# Patient Record
Sex: FEMALE | Race: BLACK OR AFRICAN AMERICAN | NOT HISPANIC OR LATINO | URBAN - METROPOLITAN AREA
[De-identification: names, ages, dates, MRNs, and addresses within clinical notes are randomized per-mention and may not be internally consistent; named-entity substitution may affect disease eponyms.]

---

## 2021-12-31 ENCOUNTER — EMERGENCY (EMERGENCY)
Facility: HOSPITAL | Age: 36
LOS: 1 days | Discharge: HOME | End: 2021-12-31
Admitting: EMERGENCY MEDICINE
Payer: MEDICARE

## 2021-12-31 VITALS
HEART RATE: 101 BPM | DIASTOLIC BLOOD PRESSURE: 87 MMHG | SYSTOLIC BLOOD PRESSURE: 130 MMHG | RESPIRATION RATE: 18 BRPM | TEMPERATURE: 98 F | WEIGHT: 199.96 LBS | OXYGEN SATURATION: 100 % | HEIGHT: 66 IN

## 2021-12-31 DIAGNOSIS — Z20.822 CONTACT WITH AND (SUSPECTED) EXPOSURE TO COVID-19: ICD-10-CM

## 2021-12-31 DIAGNOSIS — R05.1 ACUTE COUGH: ICD-10-CM

## 2021-12-31 DIAGNOSIS — B34.9 VIRAL INFECTION, UNSPECIFIED: ICD-10-CM

## 2021-12-31 PROCEDURE — 99283 EMERGENCY DEPT VISIT LOW MDM: CPT

## 2021-12-31 PROCEDURE — U0003: CPT

## 2021-12-31 PROCEDURE — U0005: CPT

## 2021-12-31 PROCEDURE — 99284 EMERGENCY DEPT VISIT MOD MDM: CPT

## 2021-12-31 NOTE — ED PROVIDER NOTE - CLINICAL SUMMARY MEDICAL DECISION MAKING FREE TEXT BOX
Patient is presenting to the Emergency Department with viral URI symptoms, requesting COVID-19/Flu testing.  Patient has an unremarkable focused exam and looks well.  DO not suspect sepsis, pneumonia, strep or other bacterial infection.  Nasopharyngeal PCR has been obtained and patient has been guided on how to obtain their results.  General COVID-19/URI discharge instructions have been given to the patient.

## 2021-12-31 NOTE — ED ADULT TRIAGE NOTE - CHIEF COMPLAINT QUOTE
congestion and chest tightness x 3 days. requesting covid test. speaking in clear appropriate sentences. airway patent. breathing unlabored.

## 2021-12-31 NOTE — ED ADULT TRIAGE NOTE - CAS ELECT INFOMATION PROVIDED
Pt presents to ED C/O intermittent midsternal chest pain. EKG completed in Triage. No distress upon D/C. Req COVID swab/DC instructions

## 2021-12-31 NOTE — ED PROVIDER NOTE - PHYSICAL EXAMINATION
Physical Exam:    CONSTITUTIONAL:  Generally well appearing, no acute distress, alert, awake and oriented  HEENT:  Moist mucous membranes, normal voice, normal posterior pharynx, no sinus ttp  PULM:  No accessory muscle use, speaking full sentences, clear to auscultation  SKIN:  Normal in appearance, normal color

## 2021-12-31 NOTE — ED PROVIDER NOTE - OBJECTIVE STATEMENT
36 year old patient presenting with URI symptoms of cough/congestion for 2 days.  Denies chest pain, shortness of breath, lower extremity edema.

## 2021-12-31 NOTE — ED PROVIDER NOTE - PATIENT PORTAL LINK FT
You can access the FollowMyHealth Patient Portal offered by [facility] by registering at the following website: [email or web address]. By joining [email or web address] [email or web address] portal, you will also be able to view your health information using other applications (apps) compatible with our system.

## 2022-01-01 LAB — SARS-COV-2 RNA SPEC QL NAA+PROBE: SIGNIFICANT CHANGE UP

## 2023-03-02 ENCOUNTER — APPOINTMENT (OUTPATIENT)
Dept: OTOLARYNGOLOGY | Facility: CLINIC | Age: 38
End: 2023-03-02
Payer: COMMERCIAL

## 2023-03-02 VITALS
HEIGHT: 66 IN | HEART RATE: 96 BPM | DIASTOLIC BLOOD PRESSURE: 84 MMHG | OXYGEN SATURATION: 100 % | WEIGHT: 190 LBS | BODY MASS INDEX: 30.53 KG/M2 | SYSTOLIC BLOOD PRESSURE: 136 MMHG | TEMPERATURE: 98 F

## 2023-03-02 DIAGNOSIS — J31.0 CHRONIC RHINITIS: ICD-10-CM

## 2023-03-02 PROCEDURE — 99203 OFFICE O/P NEW LOW 30 MIN: CPT | Mod: 25

## 2023-03-02 PROCEDURE — 31575 DIAGNOSTIC LARYNGOSCOPY: CPT

## 2023-03-02 RX ORDER — FLUTICASONE PROPIONATE 50 UG/1
50 SPRAY, METERED NASAL DAILY
Qty: 1 | Refills: 6 | Status: ACTIVE | COMMUNITY
Start: 2023-03-02 | End: 1900-01-01

## 2023-03-02 NOTE — REASON FOR VISIT
[Initial Evaluation] : an initial evaluation for [FreeTextEntry2] : New occurrence of clear nasal discharge from nostrils for the for the past 5 days.  Patient states her level of severity is a level 9 out of 10 and it occurs constant.  Patient states nothing helps to improve or worsens her New occurrence of clear nasal discharge from nostrils for the for the past 5 days.

## 2023-03-02 NOTE — PROCEDURE
[Congested] : congested [___ cm] : [unfilled]Ucm polyp(s) on the left [Image(s) Captured] : image(s) captured and filed [Topical Lidocaine] : topical lidocaine [Oxymetazoline HCl] : oxymetazoline HCl [Flexible Endoscope] : examined with the flexible endoscope [Serial Number: ___] : Serial Number: [unfilled] [de-identified] : Turbinate Hypertrophy.  Left nasal polyp.  Middle Meatus.

## 2023-03-02 NOTE — HISTORY OF PRESENT ILLNESS
[de-identified] : 37 years old female patient with history of New occurrence of clear nasal discharge from nostrils for the for the past 5 days.   Patient is present today in the office with Tonsil Hypertrophy 3 + .  Turbinate Hypertrophy.  Left nasal polyp.  Middle Meatus.   Patient C/O Loud snoring. Observed episodes of stopped breathing during sleep. Abrupt awakenings accompanied by gasping or choking. Awakening with a dry mouth or sore throat.

## 2023-03-02 NOTE — PHYSICAL EXAM
[Normal] : no rashes [FreeTextEntry1] : Obese [] : septum deviated bilaterally [de-identified] : Large ?polyps Lt [de-identified] : Tonsil Hypertrophy 3 + .  Turbinate Hypertrophy.  Left nasal polyp.  Middle Meatus.   Patient C/O Loud snoring.

## 2023-03-08 ENCOUNTER — OUTPATIENT (OUTPATIENT)
Dept: OUTPATIENT SERVICES | Facility: HOSPITAL | Age: 38
LOS: 1 days | End: 2023-03-08
Payer: COMMERCIAL

## 2023-03-08 ENCOUNTER — APPOINTMENT (OUTPATIENT)
Dept: CT IMAGING | Facility: HOSPITAL | Age: 38
End: 2023-03-08

## 2023-03-09 PROCEDURE — 70486 CT MAXILLOFACIAL W/O DYE: CPT

## 2023-03-09 PROCEDURE — 70486 CT MAXILLOFACIAL W/O DYE: CPT | Mod: 26

## 2023-03-14 ENCOUNTER — APPOINTMENT (OUTPATIENT)
Dept: OTOLARYNGOLOGY | Facility: CLINIC | Age: 38
End: 2023-03-14
Payer: COMMERCIAL

## 2023-03-14 DIAGNOSIS — J35.1 HYPERTROPHY OF TONSILS: ICD-10-CM

## 2023-03-14 PROCEDURE — 99213 OFFICE O/P EST LOW 20 MIN: CPT

## 2023-03-14 NOTE — HISTORY OF PRESENT ILLNESS
[de-identified] : 37 years old female patient with history of Tonsil Hypertrophy 3 + .  Turbinate Hypertrophy.  Left nasal polyp.  Middle Meatus.   Patient C/O Loud snoring. Observed episodes of stopped breathing during sleep. Abrupt awakenings accompanied by gasping or choking. Awakening with a dry mouth or sore throat.  Patient is present today in the office for Sinus CT-Scan  Left Sinusitis.

## 2023-03-14 NOTE — REASON FOR VISIT
[Subsequent Evaluation] : a subsequent evaluation for [FreeTextEntry2] :  Tonsil Hypertrophy 3 + .  Turbinate Hypertrophy.  Left nasal polyp.  Middle Meatus.   Sinus CT-Scan

## 2023-03-14 NOTE — REVIEW OF SYSTEMS
[Patient Intake Form Reviewed] : Patient intake form was reviewed [As Noted in HPI] : as noted in HPI [Nasal Congestion] : nasal congestion [Problem Snoring] : problem snoring [Sinus Pain] : sinus pain [Sinus Pressure] : sinus pressure [Negative] : Heme/Lymph

## 2023-03-14 NOTE — PROCEDURE
[Flexible Endoscope] : examined with the flexible endoscope [Congested] : congested [___ cm] : [unfilled]Ucm polyp(s) on the left [de-identified] : Turbinate Hypertrophy.  Left nasal polyp.  Middle Meatus.

## 2023-03-14 NOTE — PHYSICAL EXAM
[] : septum deviated bilaterally [Normal] : no rashes [FreeTextEntry1] : Obese [de-identified] : Large ?polyps Lt [de-identified] : Tonsil Hypertrophy 3 + .  Turbinate Hypertrophy.  Left nasal polyp.  Middle Meatus.   Patient C/O Loud snoring.

## 2023-03-21 ENCOUNTER — APPOINTMENT (OUTPATIENT)
Dept: SLEEP CENTER | Facility: HOME HEALTH | Age: 38
End: 2023-03-21
Payer: COMMERCIAL

## 2023-03-21 ENCOUNTER — OUTPATIENT (OUTPATIENT)
Dept: OUTPATIENT SERVICES | Facility: HOSPITAL | Age: 38
LOS: 1 days | End: 2023-03-21
Payer: COMMERCIAL

## 2023-03-21 PROCEDURE — 95800 SLP STDY UNATTENDED: CPT

## 2023-03-21 PROCEDURE — 95800 SLP STDY UNATTENDED: CPT | Mod: 26

## 2023-03-22 DIAGNOSIS — G47.33 OBSTRUCTIVE SLEEP APNEA (ADULT) (PEDIATRIC): ICD-10-CM

## 2023-04-04 ENCOUNTER — APPOINTMENT (OUTPATIENT)
Dept: OTOLARYNGOLOGY | Facility: CLINIC | Age: 38
End: 2023-04-04
Payer: COMMERCIAL

## 2023-04-04 DIAGNOSIS — J34.89 OTHER SPECIFIED DISORDERS OF NOSE AND NASAL SINUSES: ICD-10-CM

## 2023-04-04 DIAGNOSIS — G47.10 HYPERSOMNIA, UNSPECIFIED: ICD-10-CM

## 2023-04-04 DIAGNOSIS — G47.30 HYPERSOMNIA, UNSPECIFIED: ICD-10-CM

## 2023-04-04 DIAGNOSIS — J32.8 OTHER CHRONIC SINUSITIS: ICD-10-CM

## 2023-04-04 PROCEDURE — 99213 OFFICE O/P EST LOW 20 MIN: CPT

## 2023-04-11 ENCOUNTER — RESULT CHARGE (OUTPATIENT)
Age: 38
End: 2023-04-11

## 2023-04-12 ENCOUNTER — APPOINTMENT (OUTPATIENT)
Dept: INTERNAL MEDICINE | Facility: CLINIC | Age: 38
End: 2023-04-12
Payer: COMMERCIAL

## 2023-04-12 ENCOUNTER — NON-APPOINTMENT (OUTPATIENT)
Age: 38
End: 2023-04-12

## 2023-04-12 VITALS
BODY MASS INDEX: 41.46 KG/M2 | DIASTOLIC BLOOD PRESSURE: 76 MMHG | WEIGHT: 258 LBS | HEIGHT: 66 IN | SYSTOLIC BLOOD PRESSURE: 109 MMHG | HEART RATE: 87 BPM | TEMPERATURE: 98.4 F

## 2023-04-12 VITALS — DIASTOLIC BLOOD PRESSURE: 89 MMHG | SYSTOLIC BLOOD PRESSURE: 132 MMHG

## 2023-04-12 DIAGNOSIS — Z82.49 FAMILY HISTORY OF ISCHEMIC HEART DISEASE AND OTHER DISEASES OF THE CIRCULATORY SYSTEM: ICD-10-CM

## 2023-04-12 DIAGNOSIS — G47.9 SLEEP DISORDER, UNSPECIFIED: ICD-10-CM

## 2023-04-12 DIAGNOSIS — Z87.19 PERSONAL HISTORY OF OTHER DISEASES OF THE DIGESTIVE SYSTEM: ICD-10-CM

## 2023-04-12 DIAGNOSIS — R94.31 ABNORMAL ELECTROCARDIOGRAM [ECG] [EKG]: ICD-10-CM

## 2023-04-12 DIAGNOSIS — Z01.818 ENCOUNTER FOR OTHER PREPROCEDURAL EXAMINATION: ICD-10-CM

## 2023-04-12 DIAGNOSIS — Z00.00 ENCOUNTER FOR GENERAL ADULT MEDICAL EXAMINATION W/OUT ABNORMAL FINDINGS: ICD-10-CM

## 2023-04-12 DIAGNOSIS — F17.210 NICOTINE DEPENDENCE, CIGARETTES, UNCOMPLICATED: ICD-10-CM

## 2023-04-12 DIAGNOSIS — R03.0 ELEVATED BLOOD-PRESSURE READING, W/OUT DIAGNOSIS OF HYPERTENSION: ICD-10-CM

## 2023-04-12 DIAGNOSIS — D64.9 ANEMIA, UNSPECIFIED: ICD-10-CM

## 2023-04-12 DIAGNOSIS — R68.81 EARLY SATIETY: ICD-10-CM

## 2023-04-12 DIAGNOSIS — E55.9 VITAMIN D DEFICIENCY, UNSPECIFIED: ICD-10-CM

## 2023-04-12 DIAGNOSIS — E66.9 OBESITY, UNSPECIFIED: ICD-10-CM

## 2023-04-12 DIAGNOSIS — Z23 ENCOUNTER FOR IMMUNIZATION: ICD-10-CM

## 2023-04-12 DIAGNOSIS — K21.9 GASTRO-ESOPHAGEAL REFLUX DISEASE W/OUT ESOPHAGITIS: ICD-10-CM

## 2023-04-12 PROCEDURE — 99204 OFFICE O/P NEW MOD 45 MIN: CPT | Mod: 25

## 2023-04-12 PROCEDURE — 36415 COLL VENOUS BLD VENIPUNCTURE: CPT

## 2023-04-12 PROCEDURE — 93000 ELECTROCARDIOGRAM COMPLETE: CPT

## 2023-04-12 PROCEDURE — 99395 PREV VISIT EST AGE 18-39: CPT | Mod: 25

## 2023-04-12 RX ORDER — CHLORHEXIDINE GLUCONATE 4 %
LIQUID (ML) TOPICAL DAILY
Qty: 90 | Refills: 0 | Status: ACTIVE | COMMUNITY
Start: 2023-04-12 | End: 1900-01-01

## 2023-04-12 RX ORDER — AMOXICILLIN AND CLAVULANATE POTASSIUM 875; 125 MG/1; MG/1
875-125 TABLET, COATED ORAL
Qty: 14 | Refills: 0 | Status: COMPLETED | COMMUNITY
Start: 2023-03-14 | End: 2023-04-12

## 2023-04-13 DIAGNOSIS — A04.8 OTHER SPECIFIED BACTERIAL INTESTINAL INFECTIONS: ICD-10-CM

## 2023-04-13 PROBLEM — D64.9 ANEMIA: Status: ACTIVE | Noted: 2023-04-13

## 2023-04-13 PROBLEM — F17.210 LIGHT CIGARETTE SMOKER: Status: ACTIVE | Noted: 2023-04-13

## 2023-04-14 PROBLEM — J32.8 OTHER CHRONIC SINUSITIS: Status: ACTIVE | Noted: 2023-04-14

## 2023-04-14 PROBLEM — G47.10 HYPERSOMNIA WITH SLEEP APNEA: Status: ACTIVE | Noted: 2023-04-14

## 2023-04-14 PROBLEM — J34.89 OBSTRUCTION OF NOSE: Status: ACTIVE | Noted: 2023-04-14

## 2023-04-17 PROBLEM — R94.31 EKG ABNORMALITIES: Status: ACTIVE | Noted: 2023-04-17

## 2023-04-17 PROBLEM — K21.9 MILD ACID REFLUX: Status: ACTIVE | Noted: 2023-04-12

## 2023-04-17 PROBLEM — Z01.818 PREOP EXAMINATION: Status: ACTIVE | Noted: 2023-04-12

## 2023-04-17 PROBLEM — E55.9 VITAMIN D DEFICIENCY: Status: ACTIVE | Noted: 2023-04-12

## 2023-04-17 PROBLEM — R68.81 EARLY SATIETY: Status: ACTIVE | Noted: 2023-04-12

## 2023-04-17 PROBLEM — Z87.19 HISTORY OF RECTAL BLEEDING: Status: RESOLVED | Noted: 2023-04-12 | Resolved: 2023-04-17

## 2023-04-17 PROBLEM — G47.9 SLEEP DISTURBANCES: Status: ACTIVE | Noted: 2023-03-02

## 2023-04-17 PROBLEM — Z23 ENCOUNTER FOR IMMUNIZATION: Status: ACTIVE | Noted: 2023-04-17

## 2023-04-17 PROBLEM — E66.9 OBESITY (BMI 30.0-34.9): Status: ACTIVE | Noted: 2023-04-12

## 2023-04-17 LAB
25(OH)D3 SERPL-MCNC: 10.2 NG/ML
ALBUMIN SERPL ELPH-MCNC: 4.5 G/DL
ALP BLD-CCNC: 87 U/L
ALT SERPL-CCNC: 14 U/L
ANION GAP SERPL CALC-SCNC: 12 MMOL/L
APTT BLD: 30.7 SEC
AST SERPL-CCNC: 15 U/L
BASOPHILS # BLD AUTO: 0.05 K/UL
BASOPHILS NFR BLD AUTO: 0.5 %
BILIRUB SERPL-MCNC: 0.3 MG/DL
BUN SERPL-MCNC: 14 MG/DL
CALCIUM SERPL-MCNC: 9.6 MG/DL
CHLORIDE SERPL-SCNC: 103 MMOL/L
CHOLEST SERPL-MCNC: 185 MG/DL
CO2 SERPL-SCNC: 25 MMOL/L
CREAT SERPL-MCNC: 0.84 MG/DL
EGFR: 91 ML/MIN/1.73M2
EOSINOPHIL # BLD AUTO: 0.22 K/UL
EOSINOPHIL NFR BLD AUTO: 2.3 %
ESTIMATED AVERAGE GLUCOSE: 117 MG/DL
FERRITIN SERPL-MCNC: 15 NG/ML
FOLATE SERPL-MCNC: 11.1 NG/ML
GLUCOSE SERPL-MCNC: 89 MG/DL
HBA1C MFR BLD HPLC: 5.7 %
HCG SERPL-MCNC: <1 MIU/ML
HCT VFR BLD CALC: 35.5 %
HDLC SERPL-MCNC: 65 MG/DL
HGB BLD-MCNC: 10.7 G/DL
IMM GRANULOCYTES NFR BLD AUTO: 0.3 %
INR PPP: 1.01 RATIO
IRON SATN MFR SERPL: 15 %
IRON SERPL-MCNC: 68 UG/DL
LDLC SERPL CALC-MCNC: 101 MG/DL
LYMPHOCYTES # BLD AUTO: 2.45 K/UL
LYMPHOCYTES NFR BLD AUTO: 26 %
MAN DIFF?: NORMAL
MCHC RBC-ENTMCNC: 26.2 PG
MCHC RBC-ENTMCNC: 30.1 GM/DL
MCV RBC AUTO: 87 FL
MONOCYTES # BLD AUTO: 0.52 K/UL
MONOCYTES NFR BLD AUTO: 5.5 %
NEUTROPHILS # BLD AUTO: 6.17 K/UL
NEUTROPHILS NFR BLD AUTO: 65.4 %
NONHDLC SERPL-MCNC: 121 MG/DL
PLATELET # BLD AUTO: 284 K/UL
POTASSIUM SERPL-SCNC: 4.5 MMOL/L
PROT SERPL-MCNC: 7.3 G/DL
PT BLD: 11.8 SEC
RBC # BLD: 4.08 M/UL
RBC # FLD: 15.2 %
SODIUM SERPL-SCNC: 141 MMOL/L
TIBC SERPL-MCNC: 442 UG/DL
TRIGL SERPL-MCNC: 97 MG/DL
TSH SERPL-ACNC: 2.03 UIU/ML
UIBC SERPL-MCNC: 374 UG/DL
UREA BREATH TEST QL: POSITIVE
VIT B12 SERPL-MCNC: 341 PG/ML
WBC # FLD AUTO: 9.44 K/UL

## 2023-04-17 RX ORDER — ERGOCALCIFEROL (VITAMIN D2) 50 MCG
50 MCG CAPSULE ORAL
Qty: 90 | Refills: 3 | Status: COMPLETED | COMMUNITY
Start: 2023-04-12 | End: 2023-04-17

## 2023-04-17 NOTE — HEALTH RISK ASSESSMENT
[Patient reported PAP Smear was normal] : Patient reported PAP Smear was normal [Patient reported colonoscopy was normal] : Patient reported colonoscopy was normal [No falls in past year] : Patient reported no falls in the past year [0] : 2) Feeling down, depressed, or hopeless: Not at all (0) [PHQ-2 Negative - No further assessment needed] : PHQ-2 Negative - No further assessment needed [HIV test declined] : HIV test declined [Hepatitis C test declined] : Hepatitis C test declined [LKB4Badpc] : 0 [PapSmearDate] : 2021 [ColonoscopyDate] : 2022 [Current] : Current [0-4] : 0-4

## 2023-04-17 NOTE — ASSESSMENT
[Patient Optimized for Surgery] : Patient optimized for surgery [No Further Testing Recommended] : no further testing recommended [Continue medications as is] : Continue current medications [As per surgery] : as per surgery [High Risk Surgery - Intraperitoneal, Intrathoracic or Supringuinal Vascular Procedures] : High Risk Surgery - Intraperitoneal, Intrathoracic or Supringuinal Vascular Procedures - No (0) [Ischemic Heart Disease] : Ischemic Heart Disease - No (0) [Congestive Heart Failure] : Congestive Heart Failure - No (0) [Prior Cerebrovascular Accident or TIA] : Prior Cerebrovascular Accident or TIA - No (0) [Creatinine >= 2mg/dL (1 Point)] : Creatinine >= 2mg/dL - No (0) [0] : 0 , RCRI Class: I, Risk of Post-Op Cardiac Complications: 3.9%, 95% CI for Risk Estimate: 2.8% - 5.4% [Insulin-dependent Diabetic (1 Point)] : Insulin-dependent Diabetic - No (0) [FreeTextEntry4] : Pt is LOW risk for LOW risk procedure. Pt may proceed with elective surgery.

## 2023-04-17 NOTE — RESULTS/DATA
[] : not indicated [de-identified] : H/H 10.7/35.5 [de-identified] : PT/INR 11.8/1.01, APTT 30.7 [de-identified] : BUN/Cr 14/0.84 [de-identified] :  atrial rhythm 77 bpm

## 2023-04-17 NOTE — HISTORY OF PRESENT ILLNESS
[No Pertinent Cardiac History] : no history of aortic stenosis, atrial fibrillation, coronary artery disease, recent myocardial infarction, or implantable device/pacemaker [Sleep Apnea] : sleep apnea [Smoker] : smoker [No Adverse Anesthesia Reaction] : no adverse anesthesia reaction in self or family member [(Patient denies any chest pain, claudication, dyspnea on exertion, orthopnea, palpitations or syncope)] : Patient denies any chest pain, claudication, dyspnea on exertion, orthopnea, palpitations or syncope [Moderate (4-6 METs)] : Moderate (4-6 METs) [Asthma] : no asthma [COPD] : no COPD [Chronic Anticoagulation] : no chronic anticoagulation [Chronic Kidney Disease] : no chronic kidney disease [Diabetes] : no diabetes [FreeTextEntry2] :  4/27/23 [FreeTextEntry3] : Dr. Norris [FreeTextEntry4] : Pt is a 36 y/o F with PMHx of TIFFANIE who presents to the office today for medical clearance for Uvulopalatopharyngoplasty on 4/27/23.\par \par Pt states she will be undergoing UPPP for surgical correction of her TIFFANIE [FreeTextEntry1] : establishment of care/ annual physical [de-identified] : Pt is a 38 y/o F with PMHx of TIFFANIE, obesity who presents to the office today for establishment of care/ annual physical\par \par Rectal bleeding (significant)\par - Reports was hospitalized for it\par - colonoscopy 1 year ago\par - is unsure of what the dx was - possibly hemorrhoids?\par - had virtual appointments with GI and that was it \par \par Obesity:\par - Pt states she has been having difficulty losing weight despite early satiety \par - she states she eats "very little" but does endorse calory dense meals. \par - She will have reyna, egg and cheese in the morning and lots of candy throughout the day\par \par HCM\par - Covid vaccine: pfizer no boosters\par - Influenza vaccine: declines \par - Gardasil: has not had it in the past\par - Colonoscopy: 1 year ago\par - Pap: 1-2 years. WNL.  GYN needs referral\par - Diet: Poor \par - Exercise: sedentary

## 2023-04-17 NOTE — HISTORY OF PRESENT ILLNESS
[No Pertinent Cardiac History] : no history of aortic stenosis, atrial fibrillation, coronary artery disease, recent myocardial infarction, or implantable device/pacemaker [Sleep Apnea] : sleep apnea [Smoker] : smoker [No Adverse Anesthesia Reaction] : no adverse anesthesia reaction in self or family member [(Patient denies any chest pain, claudication, dyspnea on exertion, orthopnea, palpitations or syncope)] : Patient denies any chest pain, claudication, dyspnea on exertion, orthopnea, palpitations or syncope [Moderate (4-6 METs)] : Moderate (4-6 METs) [Asthma] : no asthma [COPD] : no COPD [Chronic Anticoagulation] : no chronic anticoagulation [Chronic Kidney Disease] : no chronic kidney disease [Diabetes] : no diabetes [FreeTextEntry2] :  4/27/23 [FreeTextEntry3] : Dr. Norris [FreeTextEntry4] : Pt is a 34 y/o F with PMHx of TIFFANIE who presents to the office today for medical clearance for Uvulopalatopharyngoplasty on 4/27/23.\par \par Pt states she will be undergoing UPPP for surgical correction of her TIFFANIE [FreeTextEntry1] : establishment of care/ annual physical [de-identified] : Pt is a 38 y/o F with PMHx of TIFFANIE, obesity who presents to the office today for establishment of care/ annual physical\par \par Rectal bleeding (significant)\par - Reports was hospitalized for it\par - colonoscopy 1 year ago\par - is unsure of what the dx was - possibly hemorrhoids?\par - had virtual appointments with GI and that was it \par \par Obesity:\par - Pt states she has been having difficulty losing weight despite early satiety \par - she states she eats "very little" but does endorse calory dense meals. \par - She will have reyna, egg and cheese in the morning and lots of candy throughout the day\par \par HCM\par - Covid vaccine: pfizer no boosters\par - Influenza vaccine: declines \par - Gardasil: has not had it in the past\par - Colonoscopy: 1 year ago\par - Pap: 1-2 years. WNL.  GYN needs referral\par - Diet: Poor \par - Exercise: sedentary

## 2023-04-17 NOTE — PHYSICAL EXAM
[Normal Outer Ear/Nose] : the outer ears and nose were normal in appearance [No JVD] : no jugular venous distention [No Lymphadenopathy] : no lymphadenopathy [No Palpable Aorta] : no palpable aorta [No Rash] : no rash [No Focal Deficits] : no focal deficits [No Acute Distress] : no acute distress [Normal Sclera/Conjunctiva] : normal sclera/conjunctiva [No Edema] : there was no peripheral edema [Normal] : no rash

## 2023-04-17 NOTE — RESULTS/DATA
[] : not indicated [de-identified] : H/H 10.7/35.5 [de-identified] : PT/INR 11.8/1.01, APTT 30.7 [de-identified] : BUN/Cr 14/0.84 [de-identified] :  atrial rhythm 77 bpm

## 2023-04-17 NOTE — ASSESSMENT
[Patient Optimized for Surgery] : Patient optimized for surgery [No Further Testing Recommended] : no further testing recommended [Continue medications as is] : Continue current medications [As per surgery] : as per surgery [High Risk Surgery - Intraperitoneal, Intrathoracic or Supringuinal Vascular Procedures] : High Risk Surgery - Intraperitoneal, Intrathoracic or Supringuinal Vascular Procedures - No (0) [Ischemic Heart Disease] : Ischemic Heart Disease - No (0) [Congestive Heart Failure] : Congestive Heart Failure - No (0) [Prior Cerebrovascular Accident or TIA] : Prior Cerebrovascular Accident or TIA - No (0) [Creatinine >= 2mg/dL (1 Point)] : Creatinine >= 2mg/dL - No (0) [Insulin-dependent Diabetic (1 Point)] : Insulin-dependent Diabetic - No (0) [0] : 0 , RCRI Class: I, Risk of Post-Op Cardiac Complications: 3.9%, 95% CI for Risk Estimate: 2.8% - 5.4% [FreeTextEntry4] : Pt is LOW risk for LOW risk procedure. Pt may proceed with elective surgery.

## 2023-04-17 NOTE — HEALTH RISK ASSESSMENT
[Patient reported PAP Smear was normal] : Patient reported PAP Smear was normal [Patient reported colonoscopy was normal] : Patient reported colonoscopy was normal [No falls in past year] : Patient reported no falls in the past year [0] : 2) Feeling down, depressed, or hopeless: Not at all (0) [PHQ-2 Negative - No further assessment needed] : PHQ-2 Negative - No further assessment needed [HIV test declined] : HIV test declined [Hepatitis C test declined] : Hepatitis C test declined [[id number]] : 0 [PapSmearDate] : 2021 [ColonoscopyDate] : 2022 [Current] : Current [0-4] : 0-4

## 2023-04-18 RX ORDER — OMEPRAZOLE 20 MG/1
20 CAPSULE, DELAYED RELEASE ORAL DAILY
Qty: 28 | Refills: 0 | Status: ACTIVE | COMMUNITY
Start: 2023-04-18 | End: 1900-01-01

## 2023-04-18 RX ORDER — AMOXICILLIN 500 MG/1
500 CAPSULE ORAL
Qty: 56 | Refills: 0 | Status: ACTIVE | COMMUNITY
Start: 2023-04-18 | End: 1900-01-01

## 2023-04-18 RX ORDER — CLARITHROMYCIN 500 MG/1
500 TABLET, FILM COATED ORAL
Qty: 28 | Refills: 0 | Status: ACTIVE | COMMUNITY
Start: 2023-04-18 | End: 1900-01-01

## 2023-04-18 RX ORDER — ERGOCALCIFEROL 1.25 MG/1
50000 CAPSULE ORAL
Qty: 13 | Refills: 3 | Status: ACTIVE | COMMUNITY
Start: 2023-04-18 | End: 1900-01-01

## 2023-04-18 RX ORDER — FERROUS SULFATE TAB EC 324 MG (65 MG FE EQUIVALENT) 324 (65 FE) MG
324 (65 FE) TABLET DELAYED RESPONSE ORAL
Qty: 45 | Refills: 3 | Status: ACTIVE | COMMUNITY
Start: 2023-04-18 | End: 1900-01-01

## 2023-04-18 NOTE — PHYSICAL EXAM
[] : septum deviated bilaterally [FreeTextEntry1] : Obese [de-identified] : Large ?polyps Lt, DNS, Turbinate Hypertrophy [de-identified] : Tonsil Hypertrophy 3 + .  Turbinate Hypertrophy.  Left nasal polyp.  Middle Meatus.   Patient C/O Loud snoring.

## 2023-04-18 NOTE — REASON FOR VISIT
[Subsequent Evaluation] : a subsequent evaluation for [Nasal Septum (Deviated)] : deviated nasal septum [Nasal Obstruction] : nasal obstruction [Sinusitis] : sinusitis [Sleep Apnea/ Snoring] : sleep apnea/ snoring [Tonsillitis] : tonsillitis [FreeTextEntry2] : Severe TIFFANIE (not a CPAP candidate) and nasal obstruction, sinusitis, T&A Hypertrophy, Deviated Nasal Septum, turbinate hypertrophy

## 2023-04-18 NOTE — HISTORY OF PRESENT ILLNESS
[de-identified] : Severe TIFFANIE and nasal obstruction, deviated nasal septum, turbinate hypertrophy, \par For UPPP, SMR, TURB reduction, T&A\par Not a CPAP candidate due to upper and lower airway obstruction

## 2023-04-26 ENCOUNTER — TRANSCRIPTION ENCOUNTER (OUTPATIENT)
Age: 38
End: 2023-04-26

## 2023-04-26 RX ORDER — LIDOCAINE HYDROCHLORIDE 20 MG/ML
2 SOLUTION ORAL; TOPICAL
Qty: 1 | Refills: 4 | Status: ACTIVE | COMMUNITY
Start: 2023-04-26 | End: 1900-01-01

## 2023-04-26 RX ORDER — DOCUSATE SODIUM 100 MG/1
100 CAPSULE ORAL TWICE DAILY
Qty: 60 | Refills: 0 | Status: ACTIVE | COMMUNITY
Start: 2023-04-26 | End: 1900-01-01

## 2023-04-26 RX ORDER — ACETAMINOPHEN AND CODEINE 300; 30 MG/1; MG/1
300-30 TABLET ORAL
Qty: 25 | Refills: 0 | Status: ACTIVE | COMMUNITY
Start: 2023-04-26 | End: 1900-01-01

## 2023-04-26 RX ORDER — METHYLPREDNISOLONE 4 MG/1
4 TABLET ORAL
Qty: 1 | Refills: 0 | Status: ACTIVE | COMMUNITY
Start: 2023-04-26 | End: 1900-01-01

## 2023-04-26 RX ORDER — AMOXICILLIN 500 MG/1
500 CAPSULE ORAL
Qty: 14 | Refills: 0 | Status: ACTIVE | COMMUNITY
Start: 2023-04-26 | End: 1900-01-01

## 2023-04-26 NOTE — ASU PATIENT PROFILE, ADULT - NS PREOP UNDERSTANDS INFO
No solid food or milk products after 12 mid-night 4/26/2023/ water no later than 7:15am DOS/ photo ID and insurance card/ comfortable clothing/ escort to take her home/yes

## 2023-04-26 NOTE — ASU PATIENT PROFILE, ADULT - FALL HARM RISK - UNIVERSAL INTERVENTIONS
Bed in lowest position, wheels locked, appropriate side rails in place/Call bell, personal items and telephone in reach/Instruct patient to call for assistance before getting out of bed or chair/Non-slip footwear when patient is out of bed/[place] to call system/Physically safe environment - no spills, clutter or unnecessary equipment/Purposeful Proactive Rounding/Room/bathroom lighting operational, light cord in reach

## 2023-04-27 ENCOUNTER — APPOINTMENT (OUTPATIENT)
Dept: OTOLARYNGOLOGY | Facility: AMBULATORY SURGERY CENTER | Age: 38
End: 2023-04-27

## 2023-04-27 ENCOUNTER — OUTPATIENT (OUTPATIENT)
Dept: OUTPATIENT SERVICES | Facility: HOSPITAL | Age: 38
LOS: 1 days | Discharge: HOME | End: 2023-04-27
Payer: COMMERCIAL

## 2023-04-27 ENCOUNTER — TRANSCRIPTION ENCOUNTER (OUTPATIENT)
Age: 38
End: 2023-04-27

## 2023-04-27 ENCOUNTER — RESULT REVIEW (OUTPATIENT)
Age: 38
End: 2023-04-27

## 2023-04-27 VITALS
RESPIRATION RATE: 16 BRPM | SYSTOLIC BLOOD PRESSURE: 124 MMHG | HEART RATE: 74 BPM | OXYGEN SATURATION: 100 % | TEMPERATURE: 98 F | DIASTOLIC BLOOD PRESSURE: 82 MMHG

## 2023-04-27 VITALS
SYSTOLIC BLOOD PRESSURE: 132 MMHG | RESPIRATION RATE: 16 BRPM | HEART RATE: 81 BPM | OXYGEN SATURATION: 100 % | WEIGHT: 258.38 LBS | TEMPERATURE: 98 F | HEIGHT: 65 IN | DIASTOLIC BLOOD PRESSURE: 89 MMHG

## 2023-04-27 PROCEDURE — 30117 REMOVAL OF INTRANASAL LESION: CPT

## 2023-04-27 PROCEDURE — 88305 TISSUE EXAM BY PATHOLOGIST: CPT | Mod: 26

## 2023-04-27 PROCEDURE — 30802 ABLATE INF TURBINATE SUBMUC: CPT

## 2023-04-27 PROCEDURE — 31256 EXPLORATION MAXILLARY SINUS: CPT | Mod: LT

## 2023-04-27 PROCEDURE — 42821 REMOVE TONSILS AND ADENOIDS: CPT

## 2023-04-27 PROCEDURE — 88304 TISSUE EXAM BY PATHOLOGIST: CPT | Mod: 26

## 2023-04-27 PROCEDURE — 88311 DECALCIFY TISSUE: CPT | Mod: 26

## 2023-04-27 DEVICE — SURGIFLO HEMOSTATIC MATRIX KIT: Type: IMPLANTABLE DEVICE | Status: FUNCTIONAL

## 2023-04-27 DEVICE — LASER EZTIP E4-100MM: Type: IMPLANTABLE DEVICE | Status: FUNCTIONAL

## 2023-04-27 RX ORDER — FENTANYL CITRATE 50 UG/ML
25 INJECTION INTRAVENOUS
Refills: 0 | Status: DISCONTINUED | OUTPATIENT
Start: 2023-04-27 | End: 2023-04-27

## 2023-04-27 RX ORDER — SODIUM CHLORIDE 9 MG/ML
1000 INJECTION, SOLUTION INTRAVENOUS
Refills: 0 | Status: DISCONTINUED | OUTPATIENT
Start: 2023-04-27 | End: 2023-04-27

## 2023-04-27 RX ADMIN — FENTANYL CITRATE 25 MICROGRAM(S): 50 INJECTION INTRAVENOUS at 12:58

## 2023-04-27 RX ADMIN — FENTANYL CITRATE 25 MICROGRAM(S): 50 INJECTION INTRAVENOUS at 13:15

## 2023-04-27 RX ADMIN — SODIUM CHLORIDE 100 MILLILITER(S): 9 INJECTION, SOLUTION INTRAVENOUS at 12:49

## 2023-04-27 RX ADMIN — FENTANYL CITRATE 25 MICROGRAM(S): 50 INJECTION INTRAVENOUS at 13:04

## 2023-04-27 NOTE — ASU DISCHARGE PLAN (ADULT/PEDIATRIC) - NS MD DC FALL RISK RISK
For information on Fall & Injury Prevention, visit: [email or web address] OR  [email or web address] OR  https://www.cdc.gov/steadi/patient.html

## 2023-04-27 NOTE — BRIEF OPERATIVE NOTE - NSICDXBRIEFPROCEDURE_GEN_ALL_CORE_FT
PROCEDURES:  Tonsillotomy, adult 27-Apr-2023 10:25:53  Blas Norris  Adenoidectomy, age older than 12 years 27-Apr-2023 10:26:03  Blas Norris  SMR (submucous resection) 27-Apr-2023 10:26:17  Blas Norris  Ablation, submucosal soft tissue, inferior nasal turbinate 27-Apr-2023 10:26:27  Blas Norris  Laser nasal septoplasty 27-Apr-2023 10:26:38  Blas Norris  FESS, with maxillary antrostomy 27-Apr-2023 10:27:22  Blas Norris

## 2023-04-27 NOTE — ASU DISCHARGE PLAN (ADULT/PEDIATRIC) - NURSING INSTRUCTIONS
SINUS SURGERY (FESS) Post Operative Instructions    Do not blow your nose for 2 weeks after surgery. Avoid lifting > 5 lbs. and no vigorous exercise for 2 weeks after surgery.    Bloody nasal drainage is normal after this surgery for 5-7 days, usually decreasing in volume with each day that passes. Drainage will flow from the front of the nose and down the back of the throat.    Post-Operative Nutritional Suggestions:    Avoid acidic, spicy, hard, or crunchy foods that may cause pain or bleeding, sodas, and caffeinated drinks for approximately 2 WEEKS AFTER SURGERY.

## 2023-04-27 NOTE — ASU DISCHARGE PLAN (ADULT/PEDIATRIC) - PROCEDURE
Tonsillotomy, Adenoidectomy, Open SMR, Submucous Resection of Nasal Septum, Septoplasty, Turbinate Reduction, Functional Endoscopic Sinus Surgery Maxillary,  Antrostomy, Laser Assisted CO2 and Diode Laser

## 2023-04-27 NOTE — ASU DISCHARGE PLAN (ADULT/PEDIATRIC) - CARE PROVIDER_API CALL
Blas Norris)  Otolaryngology  [address], [address]  [place], [place] [address]  Phone: [phone]  Fax: [phone]  Established Patient  Follow Up Time: 1 week

## 2023-04-27 NOTE — ASU DISCHARGE PLAN (ADULT/PEDIATRIC) - CALL YOUR DOCTOR IF YOU HAVE ANY OF THE FOLLOWING:
Bleeding that does not stop/Swelling that gets worse/Pain not relieved by Medications/Fever greater than (need to indicate Fahrenheit or Celsius)/Nausea and vomiting that does not stop/Unable to urinate/Excessive diarrhea

## 2023-04-27 NOTE — BRIEF OPERATIVE NOTE - COMMENTS
Please schedule an appointment to see Dr. Blas Norris in the outpatient department. (His Private Practice)

## 2023-04-27 NOTE — BRIEF OPERATIVE NOTE - NSICDXBRIEFPOSTOP_GEN_ALL_CORE_FT
POST-OP DIAGNOSIS:  Hypertrophy of tonsils and adenoids 27-Apr-2023 10:29:10  Blas Norris  Disease of tonsils and adenoids 27-Apr-2023 10:29:28  Blas Norris  DNS (deviated nasal septum) 27-Apr-2023 10:29:39  Blas Norris  Obstruction of nasal valve 27-Apr-2023 10:29:51  Blas Norris  Hypertrophy of both inferior nasal turbinates 27-Apr-2023 10:30:00  Blas Norris

## 2023-04-27 NOTE — BRIEF OPERATIVE NOTE - NSICDXBRIEFPREOP_GEN_ALL_CORE_FT
PRE-OP DIAGNOSIS:  Chronic tonsillitis and adenoiditis 27-Apr-2023 10:27:57  Blas Norris  Hypertrophy of tonsils and adenoids 27-Apr-2023 10:28:10  Blas Norris  DNS (deviated nasal septum) 27-Apr-2023 10:28:21  Blas Norris  Hypertrophy of both inferior nasal turbinates 27-Apr-2023 10:28:30  Blas Norris  Obstruction of nasal valve 27-Apr-2023 10:28:44  Blas Norris P

## 2023-04-27 NOTE — ASU DISCHARGE PLAN (ADULT/PEDIATRIC) - MEDICATION INSTRUCTIONS
Start all medications on Friday. [place] Sinus Rinse Kit / start rinse on Saturday. Rinse only once a day.  Rinse  only in the mornings

## 2023-04-28 PROBLEM — K21.9 GASTRO-ESOPHAGEAL REFLUX DISEASE WITHOUT ESOPHAGITIS: Chronic | Status: ACTIVE | Noted: 2023-04-27

## 2023-04-28 PROBLEM — J35.1 HYPERTROPHY OF TONSILS: Chronic | Status: ACTIVE | Noted: 2023-04-27

## 2023-05-04 ENCOUNTER — APPOINTMENT (OUTPATIENT)
Dept: OTOLARYNGOLOGY | Facility: CLINIC | Age: 38
End: 2023-05-04
Payer: COMMERCIAL

## 2023-05-04 ENCOUNTER — NON-APPOINTMENT (OUTPATIENT)
Age: 38
End: 2023-05-04

## 2023-05-04 DIAGNOSIS — G47.33 OBSTRUCTIVE SLEEP APNEA (ADULT) (PEDIATRIC): ICD-10-CM

## 2023-05-04 PROCEDURE — 31237 NSL/SINS NDSC SURG BX POLYPC: CPT | Mod: 50,58

## 2023-05-04 PROCEDURE — 99024 POSTOP FOLLOW-UP VISIT: CPT

## 2023-05-04 NOTE — PROCEDURE
[Debridement] : debridement  [Bilateral] : bilateral debridement of the nasal cavity [Severe] : severe [Valdemar] : on both sides [Removed] : which was removed

## 2023-05-04 NOTE — HISTORY OF PRESENT ILLNESS
[de-identified] : Status post Status post LAIT, Adenoidectomy, Laser Assisted Turbinate Reduction.  Ablation of Nasal Obstruction.  Functional Endoscopic Sinus Surgery.  Patient is present today in the office for nasal debridement and oral care.  Patient is healing according to plan.  Patient was encouraged to increase oral hydration

## 2023-05-04 NOTE — REASON FOR VISIT
[Post-Operative Visit] : a post-operative visit [FreeTextEntry2] : Status post LAIT, Adenoidectomy, Laser Assisted Turbinate Reduction.  Ablation of Nasal Obstruction.  Functional Endoscopic Sinus Surgery

## 2023-05-04 NOTE — PHYSICAL EXAM
[Normal] : no rashes [FreeTextEntry1] : Obese [] : septum deviated bilaterally [de-identified] : Large ?polyps Lt, DNS, Turbinate Hypertrophy [de-identified] : Tonsil Hypertrophy 3 + .  Turbinate Hypertrophy.  Left nasal polyp.  Middle Meatus.   Patient C/O Loud snoring.

## 2023-05-05 LAB — SURGICAL PATHOLOGY STUDY: SIGNIFICANT CHANGE UP

## 2023-05-08 ENCOUNTER — APPOINTMENT (OUTPATIENT)
Dept: INTERNAL MEDICINE | Facility: CLINIC | Age: 38
End: 2023-05-08

## 2023-05-17 ENCOUNTER — APPOINTMENT (OUTPATIENT)
Dept: OTOLARYNGOLOGY | Facility: CLINIC | Age: 38
End: 2023-05-17

## 2023-05-25 ENCOUNTER — APPOINTMENT (OUTPATIENT)
Dept: OTOLARYNGOLOGY | Facility: CLINIC | Age: 38
End: 2023-05-25
Payer: COMMERCIAL

## 2023-05-25 VITALS
OXYGEN SATURATION: 100 % | WEIGHT: 190 LBS | TEMPERATURE: 97.8 F | HEIGHT: 66 IN | SYSTOLIC BLOOD PRESSURE: 141 MMHG | DIASTOLIC BLOOD PRESSURE: 92 MMHG | HEART RATE: 91 BPM | BODY MASS INDEX: 30.53 KG/M2

## 2023-05-25 DIAGNOSIS — J35.2 HYPERTROPHY OF ADENOIDS: ICD-10-CM

## 2023-05-25 DIAGNOSIS — J34.2 DEVIATED NASAL SEPTUM: ICD-10-CM

## 2023-05-25 DIAGNOSIS — J34.3 HYPERTROPHY OF NASAL TURBINATES: ICD-10-CM

## 2023-05-25 DIAGNOSIS — J35.01 CHRONIC TONSILLITIS: ICD-10-CM

## 2023-05-25 DIAGNOSIS — J35.3 HYPERTROPHY OF TONSILS WITH HYPERTROPHY OF ADENOIDS: ICD-10-CM

## 2023-05-25 DIAGNOSIS — J33.9 NASAL POLYP, UNSPECIFIED: ICD-10-CM

## 2023-05-25 PROCEDURE — 99024 POSTOP FOLLOW-UP VISIT: CPT

## 2023-05-25 NOTE — PHYSICAL EXAM
[] : septum deviated bilaterally [Normal] : no rashes [FreeTextEntry1] : Obese [de-identified] : Large ?polyps Lt, DNS, Turbinate Hypertrophy [de-identified] : Tonsil Hypertrophy 3 + .  Turbinate Hypertrophy.  Left nasal polyp.  Middle Meatus.   Patient C/O Loud snoring.

## 2023-05-25 NOTE — PROCEDURE
[Debridement] : debridement  [Bilateral] : bilateral debridement of the nasal cavity [Moderate] : moderate [Valdemar] : on both sides [Removed] : which was removed

## 2023-05-25 NOTE — HISTORY OF PRESENT ILLNESS
[de-identified] : Status post Status post LAIT, Adenoidectomy, Laser Assisted Turbinate Reduction.  Ablation of Nasal Obstruction.  Functional Endoscopic Sinus Surgery.  Patient is present today in the office for nasal debridement and oral care.  Patient is healing according to plan.  Patient was encouraged to increase oral hydration

## 2024-01-03 ENCOUNTER — APPOINTMENT (OUTPATIENT)
Dept: INTERNAL MEDICINE | Facility: CLINIC | Age: 39
End: 2024-01-03

## 2024-01-09 ENCOUNTER — APPOINTMENT (OUTPATIENT)
Dept: OTOLARYNGOLOGY | Facility: CLINIC | Age: 39
End: 2024-01-09

## 2024-09-09 NOTE — ASU PATIENT PROFILE, ADULT - NS PRO ABUSE SCREEN AFRAID ANYONE YN
PAST SURGICAL HISTORY:  H/O total hysterectomy     History of arthroplasty of left shoulder     S/P Cholecystectomy     
no

## (undated) DEVICE — TUBING RAPIDVAC SMOKE EVACUATOR .25" X 10FT

## (undated) DEVICE — COTTONBALL LG

## (undated) DEVICE — PACK TONSIL ADENOID

## (undated) DEVICE — DRAPE TOWEL BLUE 17" X 24"

## (undated) DEVICE — ELCTR BOVIE TIP BLADE INSULATED 2.75" EDGE

## (undated) DEVICE — ELCTR BOVIE SUCTION 8FR 6"

## (undated) DEVICE — WARMING BLANKET LOWER ADULT

## (undated) DEVICE — PETRI DISH MED 3.5"

## (undated) DEVICE — SYR LUER LOK 10CC

## (undated) DEVICE — ELCTR BOVIE PENCIL BLADE 10FT

## (undated) DEVICE — GLV 7.5 PROTEXIS (WHITE)

## (undated) DEVICE — STICK SPONGE 1.5" ROUND

## (undated) DEVICE — Device

## (undated) DEVICE — GLV 6.5 PROTEXIS W HYDROGEL

## (undated) DEVICE — DRAPE MAYO STAND 23"

## (undated) DEVICE — NDL HYPO REGULAR BEVEL 25G X 1.5" (BLUE)

## (undated) DEVICE — GOWN ROYAL SILK XL

## (undated) DEVICE — SOL ANTI FOG

## (undated) DEVICE — SLV COMPRESSION KNEE MED

## (undated) DEVICE — SUT CHROMIC 2-0 27" CT-2

## (undated) DEVICE — APPLICATOR ESWAB 1ML LIQUID AMIES REG